# Patient Record
Sex: FEMALE | Race: WHITE | NOT HISPANIC OR LATINO | URBAN - METROPOLITAN AREA
[De-identification: names, ages, dates, MRNs, and addresses within clinical notes are randomized per-mention and may not be internally consistent; named-entity substitution may affect disease eponyms.]

---

## 2017-01-12 ENCOUNTER — OFFICE VISIT (OUTPATIENT)
Dept: SLEEP MEDICINE | Facility: CLINIC | Age: 57
End: 2017-01-12
Payer: COMMERCIAL

## 2017-01-12 VITALS
OXYGEN SATURATION: 96 % | HEIGHT: 63 IN | TEMPERATURE: 97.4 F | HEART RATE: 67 BPM | DIASTOLIC BLOOD PRESSURE: 74 MMHG | SYSTOLIC BLOOD PRESSURE: 133 MMHG | WEIGHT: 243.2 LBS | BODY MASS INDEX: 43.09 KG/M2

## 2017-01-12 DIAGNOSIS — G47.33 OSA (OBSTRUCTIVE SLEEP APNEA): ICD-10-CM

## 2017-01-12 DIAGNOSIS — G47.21 DELAYED SLEEP PHASE SYNDROME: ICD-10-CM

## 2017-01-12 DIAGNOSIS — F51.12 INSUFFICIENT SLEEP SYNDROME: ICD-10-CM

## 2017-01-12 DIAGNOSIS — R40.0 SLEEPINESS: Primary | ICD-10-CM

## 2017-01-12 PROCEDURE — 99214 OFFICE O/P EST MOD 30 MIN: CPT | Performed by: PHYSICIAN ASSISTANT

## 2017-01-12 NOTE — NURSING NOTE
"Chief Complaint   Patient presents with     Sleep Problem     follow up       Initial /74 mmHg  Pulse 67  Temp(Src) 97.4  F (36.3  C) (Oral)  Ht 1.6 m (5' 3\")  Wt 110.315 kg (243 lb 3.2 oz)  BMI 43.09 kg/m2  SpO2 96% Estimated body mass index is 43.09 kg/(m^2) as calculated from the following:    Height as of this encounter: 1.6 m (5' 3\").    Weight as of this encounter: 110.315 kg (243 lb 3.2 oz).  BP completed using cuff size: regular right arm  Sheeba Peguero MA  [facility]      "

## 2017-01-12 NOTE — PROGRESS NOTES
Sleep Study Follow-Up Visit:    Date on this visit: 1/12/2017    Huyen Lopez comes in today for follow-up of her treatment for mild ELLIE and sleepiness.    She initially was seen for sleepiness. She had a sleep study on May 5, 2013. At that time overall apnea/hypopnea index was 8.8 per hour with an overall respiratory disturbance index of 23.7 per hour.  She has an auto CPAP with pressures 7-20 cm.  Her insurance is no longer going to cover Adderall unless she is diagnosed with ADHD. She has been out for about a week and it has been a rough week.   She was last seen in 9/2016. She had concerns of residual sleepiness which was thought to be related to having to wake at 4:30 AM for work. She has a delayed sleep preference and has a hard time falling asleep before 11 PM. She was waking around 7 AM on weekends. We discussed treatment for DSPS at her prior appointment.  She has been trying to go to bed earlier. It has not been easy. She is dozing watching TV in the evening, which makes it harder to fall asleep when she gets into bed. She has been trying to get to bed between 10-10:30 PM. It can take 30 minutes to fall asleep. She wakes after a couple of hours. She wakes a couple times per night. Sometimes she is only briefly awake. Sometimes the awakenings can last up to an hour. She will turn the TV on if it is a longer awakening. Her mind wanders. She recently moved. She thinks about things she has to do and if she really likes the new location. She also has some family tension. She is now waking around 5 AM. She does not usually hit the snooze.   She did not use CPAP when she had a cold. She sometimes tears the mask off in the night. She feels claustrophobic. She does not like having something on her face.   The compliance data shows that she has used the CPAP for 24/30 nights, 63.3% of nights for >4 hours.  The 90th% pressure is 14.2 cm.  The average time in large leak is 0 sec.  The average nightly usage is 5:17.  " The average AHI is 3/hr. Her download shows that she is often not getting to bed until about 11:30-midnight. She might be falling asleep some nights before putting CPAP on and then putting it on when she wakes later. Several weekends she has not been using CPAP at all. Recently, she has been having some nights with an AHI of 5-6/hr, sometimes in clusters that would suggest residual apnea in REM. She denies alcohol or congestion recently.     She has been using a SAD lamp almost every morning for 30 minutes. She uses it in the living room while drying her hair. She is not using melatonin. On weekends, she is up by 6 AM to an alarm. She is other up a little earlier because her cat wakes her.  She is dozing at work, but not while driving.   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 She did adjust her work schedule to start 30 minutes later. She works 7:30 AM to 4 PM. She could adjust it later.     Past medical/surgical history, family history, social history, medications and allergies were reviewed.      Problem List:  Patient Active Problem List    Diagnosis Date Noted     Advanced directives, counseling/discussion 07/16/2013     Priority: Medium     Advance Care Planning:   ACP Review and Resources Provided:  Reviewed chart for advance care plan.  Huyen Lopez has no plan or code status on file. Discussed available resources and provided with information. Confirmed code status reflects current choices pending further ACP discussions. Declined information.  Carla Lala MA       Confirmed/documented designated decision maker(s). See permanent comments section of demographics in clinical tab.   Added by Carla Lala on 7/16/2013               ELLIE (obstructive sleep apnea)-mild (AHI 9) 05/16/2013     Priority: Medium     Indications for Polysomnogram 5/5/2013: The patient is a 53 y year old Female who is 5' 3\" and weighs 208.0 lbs.  Her BMI equals 36.9, [facility] sleepiness scale 18 and neck size is 14.  A " full night polysomnogram was performed to evaluate for excessive sleepiness, insomnia and possible ELLIE.     Polysomnogram Data:  A full night polysomnogram recorded the standard physiologic parameters including EEG, EOG, EMG, EKG, nasal and oral airflow.  Respiratory parameters of chest and abdominal movements were recorded with respiratory inductance plethysmography.  Oxygen saturation was recorded by pulse oximetry.      Sleep Architecture:  The total recording time of the polysomnogram was 515.4 minutes.  The total sleep time was 463.5 minutes.  Sleep latency was 11.3 minutes with Trazadone.  REM latency was 180.5 minutes.  Arousal Index was 39.6.  Sleep Efficiency was 89.9%.  Wake after sleep onset was 40.5.  The patient spent 9.4% of total sleep time in Stage N1, 38.5% in Stage N2, 44.8% in Stages N3, and 7.3% in REM.       Respiration:      Sustained Sleep Associated Hypoventilation - was not monitored.    Sleep Associated Hypoxemia - was present.  Baseline oxygen saturation was 95.1%.  Snoring was reported as loud.  Lowest oxygen saturation was 88.0%    Events - The polysomnogram revealed a presence of 13 obstructive, 0 central, and 0 mixed apneas resulting in an Apnea index of 1.7 events per hour.  There were 55 hypopneas resulting in a Hypopnea index of 7.1 events per hour.  The combined Apnea/Hypopnea Index was 8.8 events per hour.  The REM AHI is 45.9.  The supine AHI is 8.1.  The RERA index was 14.9 per hour.   The RDI was  23.7.   14.9 8.8     Movement Activity:      Limb Notes - There were 32 limb movements recorded.  Of this total, 24 were classified as PLMs.  Of the PLMs, 1 were associated with arousals.  The Limb Movement index was 4.1 per hour while the PLM index was 3.1per hour.    Behavior - none    Bruxism - none    Seizures - none    Cardiac Summary:   The average pulse rate was 52.0 bpm.  The minimum pulse rate was 39.9 bpm while the maximum pulse rate was 75.1 bpm.    Assessment:     Mild ELLIE  (AHI 9)    Recommendations:    Treatment options include a trial of auto-CPAP to assess response to treatment of ELLIE. Alternatively, a dental appliance could be considered if dentition allows or referral to ENT in carefully selected patients.     Advise regarding the risks of drowsy driving    Suggest optimizing sleep hygiene and avoiding sleep deprivation    Weight management  Dopaminergic therapy should be used for management of restless legs syndrome and not based on the presence of periodic limb movements alone       Hypertension goal BP (blood pressure) < 140/90 12/20/2012     Priority: Medium     UTI (urinary tract infection) 10/06/2012     Priority: Medium     Hypercalcemia 12/29/2011     Priority: Medium     Mixed incontinence urge and stress (male)(female)      Priority: Medium     S/P hysterectomy 04/04/2011     Priority: Medium     HYPERLIPIDEMIA LDL GOAL <130 10/31/2010     Priority: Medium     Obesity      Priority: Medium     Has lost weight with diet programs intermittently       Thyroid nodules      Priority: Medium     Noted in eval for high calcium  Problem list name updated by automated process. Provider to review and confirm       Hyperparathyroidism (H)      Priority: Medium     Followed by Dr. Roni Weston       Hypothyroidism      Priority: Medium        Impression/Plan:    (R40.0) Sleepiness  (primary encounter diagnosis), (F51.12) Insufficient sleep syndrome, (G47.21) Delayed sleep phase syndrome  Comment: Her CPAP states she is only getting an average 5:17 hours of CPAP use. She does sleep some without it as well. She has not had a single night with more than 7 hours of use in the last month. She is dozing in the evening before bedtime. Her sleep is disrupted by her cat in the morning. Her sleep at night is still fragmented, likely because of the dozing in the evening. Depression may still be playing a role in her low energy. She seemed on the verge of tears a couple of times  today.  Plan: She was advised to continue to try to go to bed around 10 PM and wake at 5 AM. Start taking 0.5-1 mg melatonin around 8 PM. If feeling tired, get up and be active to avoid dozing. If she can do this and is still having daytime sleepiness, we may consider a full sleepiness evaluation with actigraphy, PSG and MSLT. I would like to see her be able to get more sleep first and possibly re-evaluate for residual depression. We may try to delay her work start another 30 minutes to allow her to sleep more within her natural circadian preference.      (G47.33) ELLIE (obstructive sleep apnea)  Comment: Her usage is still low. She has a few nights with clusters of residual obstruction. She feels the pressure is a little low when she first starts out.  Plan: I changed her pressures to 10-20 cm. I set the ramp to start at 8 cm and last 15 minutes. I showed her how to adjust the ramp and humidity. We talked about considering the dental appliance if she continues to struggle using CPAP regularly.      She will follow up with me in about 2 month(s).     Twenty-five minutes spent with patient, all of which were spent face-to-face counseling, consulting, coordinating plan of care.      Bennett Goltz, PA-C    CC: No ref. provider found

## 2017-01-12 NOTE — MR AVS SNAPSHOT
After Visit Summary   1/12/2017    Huyen Lopez    MRN: [id number]           Patient Information     Date Of Birth          1960        Visit Information        Provider Department      1/12/2017 10:30 AM Goltz, Bennett Ezra, PA-C [facility]        Today's Diagnoses     Sleepiness    -  1     Insufficient sleep syndrome         Delayed sleep phase syndrome         ELLIE (obstructive sleep apnea)           Care Instructions    Instructions for treating Delayed Sleep Phase Syndrome:    Delayed Sleep Phase Syndrome (DSPS) means that your body's internal timing is set late compared to the 24 hour day. Therefore, it is often difficult to get up on time for work in the morning and sometimes difficult to fall asleep on time, in order to get enough sleep. People with DSPS often tend to like to stay up late on weekends and sleep in until between 10 AM and noon, sometimes even later.This is actually a bad habit that will perpetuate the problem. It reinforces your body's tendency to be on that later schedule.    You should go to bed when you are sleepy and ready to sleep. During this entire process, you should not engage in activities that may make it worse, such as watching TV in bed, leaving the TV on all night, drinking any caffeine 6 hours before bed or exercising 1-2 hours before bed.     Start taking Melatonin, 1 mg tablet 3-5 hours before the time that you fall asleep on average (not your desired bedtime or time that you get in bed, but the time you normally fall asleep on your own).     Upon awakening, get exposure to sun-light for about 30-45 minutes. You do not need to look at the sun, in fact, this is dangerous. Reading the paper with the sun shining on you is adequate.  Alternatively, you may use a Seasonal Affective Disorder Lamp (intensity 10,000 Lux) instead of the sun. The lamp should be positioned 1-2 arms lengths away from you. They lamps are sold at Home Medical  Companies such as [employer], [employer] or [employer]. A prescription can be written to get insurance coverage in some cases. They are also sold on [employer].    Using the light and melatonin should help march your internal clock (known as your circadian rhythms) gradually earlier. As your bedtime advances, remember to take your melatonin earlier, keeping it 5 hours before your fall asleep time.    Avoid naps and sleeping in because sleeping during the day will delay your body's clock and you will have to start from scratch.     More information about light therapy:  If the cost of any light box is too much, you can also purchase a compact fluorescent all spectrum light bulb at a local hardware store for about $8.  The most commonly available bulb is 1400 lumen.  You would need two of these positioned within 1 meter of yourself to be equivalent to 2,500 lux.  The bulbs can be placed in a standard light fixture.  Additionally, they can be placed in a mountable fixture that is used in greenhouses.  Mountable fixtures are also available at hardware stores for about $9.  Do not look directly at the light.  If you have any concerns regarding the safety of bright light therapy for you, it is recommended that you consult an ophthalmologist before using a light box.  If you have a condition that makes your eyes very sensitive to light, macular degeneration, a family history of such problems, or diabetic changes to your eyes, consult an ophthalmologist before using a light box. If you have anxiety disorder and have an increase in anxiety discontinue use.    Your BMI is Body mass index is 43.09 kg/(m^2).  Weight management is a personal decision.  If you are interested in exploring weight loss strategies, the following discussion covers the approaches that may be successful. Body mass index (BMI) is one way to tell whether you are at a healthy weight, overweight, or obese. It measures your weight in relation to your  height.  A BMI of 18.5 to 24.9 is in the healthy range. A person with a BMI of 25 to 29.9 is considered overweight, and someone with a BMI of 30 or greater is considered obese. More than two-thirds of American adults are considered overweight or obese.  Being overweight or obese increases the risk for further weight gain. Excess weight may lead to heart disease and diabetes.  Creating and following plans for healthy eating and physical activity may help you improve your health.  Weight control is part of healthy lifestyle and includes exercise, emotional health, and healthy eating habits. Careful eating habits lifelong are the mainstay of weight control. Though there are significant health benefits from weight loss, long-term weight loss with diet alone may be very difficult to achieve- studies show long-term success with dietary management in less than 10% of people. Attaining a healthy weight may be especially difficult to achieve in those with severe obesity. In some cases, medications, devices and surgical management might be considered.  What can you do?  If you are overweight or obese and are interested in methods for weight loss, you should discuss this with your provider.     Consider reducing daily calorie intake by 500 calories.     Keep a food journal.     Avoiding skipping meals, consider cutting portions instead.    Diet combined with exercise helps maintain muscle while optimizing fat loss. Strength training is particularly important for building and maintaining muscle mass. Exercise helps reduce stress, increase energy, and improves fitness. Increasing exercise without diet control, however, may not burn enough calories to loose weight.       Start walking three days a week 10-20 minutes at a time    Work towards walking thirty minutes five days a week     Eventually, increase the speed of your walking for 1-2 minutes at time    In addition, we recommend that you review healthy lifestyles and methods  "for weight loss available through the National Institutes of Health patient information sites:  http://win.niddk.nih.gov/publications/index.htm    And look into health and wellness programs that may be available through your health insurance provider, employer, local community center, or raine club.    Weight management plan: Patient was referred to their PCP to discuss a diet and exercise plan.            Follow-ups after your visit        Who to contact     If you have questions or need follow up information about today's clinic visit or your schedule please contact [facility] directly at [phone].  Normal or non-critical lab and imaging results will be communicated to you by MyChart, letter or phone within 4 business days after the clinic has received the results. If you do not hear from us within 7 days, please contact the clinic through [employer] or phone. If you have a critical or abnormal lab result, we will notify you by phone as soon as possible.  Submit refill requests through freee or call your pharmacy and they will forward the refill request to us. Please allow 3 business days for your refill to be completed.          Additional Information About Your Visit        MyChart Information     freee gives you secure access to your electronic health record. If you see a primary care provider, you can also send messages to your care team and make appointments. If you have questions, please call your primary care clinic.  If you do not have a primary care provider, please call [phone] and they will assist you.        Care EveryWhere ID     This is your Care EveryWhere ID. This could be used by other organizations to access your [place] medical records  [id number]        Your Vitals Were     Pulse Temperature Height BMI (Body Mass Index) Pulse Oximetry       67 97.4  F (36.3  C) (Oral) 1.6 m (5' 3\") 43.09 kg/m2 96%        Blood Pressure from Last 3 Encounters:   01/12/17 133/74 "   11/03/16 135/87   09/02/16 152/87    Weight from Last 3 Encounters:   01/12/17 110.315 kg (243 lb 3.2 oz)   11/03/16 111.041 kg (244 lb 12.8 oz)   09/02/16 113.399 kg (250 lb)              Today, you had the following     No orders found for display       Primary Care Provider Office Phone # Fax #    Jerome Otero [phone]       [facility] [address]    [place] [address]        Thank you!     Thank you for choosing [facility]  for your care. Our goal is always to provide you with excellent care. Hearing back from our patients is one way we can continue to improve our services. Please take a few minutes to complete the written survey that you may receive in the mail after your visit with us. Thank you!             Your Updated Medication List - Protect others around you: Learn how to safely use, store and throw away your medicines at www.disposemymeds.org.          This list is accurate as of: 1/12/17 11:11 AM.  Always use your most recent med list.                   Brand Name Dispense Instructions for use    amLODIPine 5 MG tablet    NORVASC    30 tablet    Take 1 tablet (5 mg) by mouth daily       amphetamine-dextroamphetamine 30 MG per 24 hr capsule    ADDERALL XR     Take 40 mg by mouth daily       aspirin 81 MG tablet     90 tablet    Take 1 tablet by mouth daily.       atenolol 50 MG tablet    TENORMIN    45 tablet    Take 0.5 tablets (25 mg) by mouth daily       clobetasol propionate 0.05 % Foam     50 g    Apply sparingly to affected area twice daily as needed.  Do not apply to face.       levothyroxine 137 MCG tablet    SYNTHROID/LEVOTHROID    30 tablet    Take 1 tablet (137 mcg) by mouth daily       lisinopril-hydrochlorothiazide 20-12.5 MG per tablet    PRINZIDE/ZESTORETIC    30 tablet    Take 1 tablet by mouth daily       order for [facility]      Respironics REMSTAR 60 Series Auto CPAP 5-20cm H2O, [place] Fx for her Small nasal mask        oxybutynin 5 MG 24 hr tablet    DITROPAN-XL    90 tablet    Take 1 tablet (5 mg) by mouth daily       simvastatin 20 MG tablet    ZOCOR    90 tablet    Take 1 tablet (20 mg) by mouth At Bedtime       venlafaxine 37.5 MG 24 hr capsule    EFFEXOR-XR     Take 150 mg by mouth daily 37.5mg once a day and 75 mg once a day       VITAMIN D-3 PO      Take 2,000 Units by mouth

## 2017-01-12 NOTE — PATIENT INSTRUCTIONS
Instructions for treating Delayed Sleep Phase Syndrome:    Delayed Sleep Phase Syndrome (DSPS) means that your body's internal timing is set late compared to the 24 hour day. Therefore, it is often difficult to get up on time for work in the morning and sometimes difficult to fall asleep on time, in order to get enough sleep. People with DSPS often tend to like to stay up late on weekends and sleep in until between 10 AM and noon, sometimes even later.This is actually a bad habit that will perpetuate the problem. It reinforces your body's tendency to be on that later schedule.    You should go to bed when you are sleepy and ready to sleep. During this entire process, you should not engage in activities that may make it worse, such as watching TV in bed, leaving the TV on all night, drinking any caffeine 6 hours before bed or exercising 1-2 hours before bed.     Start taking Melatonin, 1 mg tablet 3-5 hours before the time that you fall asleep on average (not your desired bedtime or time that you get in bed, but the time you normally fall asleep on your own).     Upon awakening, get exposure to sun-light for about 30-45 minutes. You do not need to look at the sun, in fact, this is dangerous. Reading the paper with the sun shining on you is adequate.  Alternatively, you may use a Seasonal Affective Disorder Lamp (intensity 10,000 Lux) instead of the sun. The lamp should be positioned 1-2 arms lengths away from you. They lamps are sold at Home [employer] Companies such as [employer] or [employer]. A prescription can be written to get insurance coverage in some cases. They are also sold on [employer].    Using the light and melatonin should help march your internal clock (known as your circadian rhythms) gradually earlier. As your bedtime advances, remember to take your melatonin earlier, keeping it 5 hours before your fall asleep time.    Avoid naps and sleeping in because sleeping during the day will delay  your body's clock and you will have to start from scratch.     More information about light therapy:  If the cost of any light box is too much, you can also purchase a compact fluorescent all spectrum light bulb at a local hardware store for about $8.  The most commonly available bulb is 1400 lumen.  You would need two of these positioned within 1 meter of yourself to be equivalent to 2,500 lux.  The bulbs can be placed in a standard light fixture.  Additionally, they can be placed in a mountable fixture that is used in greenhouses.  Mountable fixtures are also available at hardware stores for about $9.  Do not look directly at the light.  If you have any concerns regarding the safety of bright light therapy for you, it is recommended that you consult an ophthalmologist before using a light box.  If you have a condition that makes your eyes very sensitive to light, macular degeneration, a family history of such problems, or diabetic changes to your eyes, consult an ophthalmologist before using a light box. If you have anxiety disorder and have an increase in anxiety discontinue use.    Your BMI is Body mass index is 43.09 kg/(m^2).  Weight management is a personal decision.  If you are interested in exploring weight loss strategies, the following discussion covers the approaches that may be successful. Body mass index (BMI) is one way to tell whether you are at a healthy weight, overweight, or obese. It measures your weight in relation to your height.  A BMI of 18.5 to 24.9 is in the healthy range. A person with a BMI of 25 to 29.9 is considered overweight, and someone with a BMI of 30 or greater is considered obese. More than two-thirds of American adults are considered overweight or obese.  Being overweight or obese increases the risk for further weight gain. Excess weight may lead to heart disease and diabetes.  Creating and following plans for healthy eating and physical activity may help you improve your  health.  Weight control is part of healthy lifestyle and includes exercise, emotional health, and healthy eating habits. Careful eating habits lifelong are the mainstay of weight control. Though there are significant health benefits from weight loss, long-term weight loss with diet alone may be very difficult to achieve- studies show long-term success with dietary management in less than 10% of people. Attaining a healthy weight may be especially difficult to achieve in those with severe obesity. In some cases, medications, devices and surgical management might be considered.  What can you do?  If you are overweight or obese and are interested in methods for weight loss, you should discuss this with your provider.     Consider reducing daily calorie intake by 500 calories.     Keep a food journal.     Avoiding skipping meals, consider cutting portions instead.    Diet combined with exercise helps maintain muscle while optimizing fat loss. Strength training is particularly important for building and maintaining muscle mass. Exercise helps reduce stress, increase energy, and improves fitness. Increasing exercise without diet control, however, may not burn enough calories to loose weight.       Start walking three days a week 10-20 minutes at a time    Work towards walking thirty minutes five days a week     Eventually, increase the speed of your walking for 1-2 minutes at time    In addition, we recommend that you review healthy lifestyles and methods for weight loss available through the National Institutes of Health patient information sites:  http://win.niddk.nih.gov/publications/index.htm    And look into health and wellness programs that may be available through your health insurance provider, employer, local community center, or raine club.    Weight management plan: Patient was referred to their PCP to discuss a diet and exercise plan.

## 2017-02-14 ENCOUNTER — TELEPHONE (OUTPATIENT)
Dept: SLEEP MEDICINE | Facility: CLINIC | Age: 57
End: 2017-02-14

## 2017-03-20 ENCOUNTER — OFFICE VISIT (OUTPATIENT)
Dept: OPTOMETRY | Facility: CLINIC | Age: 57
End: 2017-03-20
Payer: COMMERCIAL

## 2017-03-20 DIAGNOSIS — H02.823 CYST OF EYELID, RIGHT: ICD-10-CM

## 2017-03-20 DIAGNOSIS — H52.4 PRESBYOPIA: Primary | ICD-10-CM

## 2017-03-20 DIAGNOSIS — H52.203 ASTIGMATISM OF BOTH EYES: ICD-10-CM

## 2017-03-20 PROCEDURE — 92014 COMPRE OPH EXAM EST PT 1/>: CPT | Performed by: OPTOMETRIST

## 2017-03-20 PROCEDURE — 92015 DETERMINE REFRACTIVE STATE: CPT | Performed by: OPTOMETRIST

## 2017-03-20 ASSESSMENT — KERATOMETRY
OD_K1POWER_DIOPTERS: 45.50
OS_K2POWER_DIOPTERS: 45.50
OD_AXISANGLE2_DEGREES: 152
OS_AXISANGLE2_DEGREES: 158
OS_K1POWER_DIOPTERS: 46.00
OD_K2POWER_DIOPTERS: 46.00

## 2017-03-20 ASSESSMENT — EXTERNAL EXAM - RIGHT EYE: OD_EXAM: NORMAL

## 2017-03-20 ASSESSMENT — REFRACTION_MANIFEST
OD_ADD: +2.25
OD_AXIS: 050
OS_SPHERE: +0.25
OS_CYLINDER: +0.50
OD_CYLINDER: +0.50
OS_SPHERE: +0.50
OS_ADD: +2.25
METHOD_AUTOREFRACTION: 1
OD_AXIS: 053
OD_SPHERE: +0.25
OS_AXIS: 175
OS_AXIS: 163
OD_CYLINDER: +0.50
OD_SPHERE: PLANO
OS_CYLINDER: +0.25

## 2017-03-20 ASSESSMENT — VISUAL ACUITY
OD_SC: 20/30
OS_CC: 20/30
OS_SC+: -2
METHOD: SNELLEN - LINEAR
OD_CC: 20/25-1
OD_SC+: -1
CORRECTION_TYPE: GLASSES
OS_SC: 20/30

## 2017-03-20 ASSESSMENT — CUP TO DISC RATIO
OD_RATIO: 0.2
OS_RATIO: 0.2

## 2017-03-20 ASSESSMENT — EXTERNAL EXAM - LEFT EYE: OS_EXAM: NORMAL

## 2017-03-20 ASSESSMENT — CONF VISUAL FIELD
OS_NORMAL: 1
OD_NORMAL: 1

## 2017-03-20 ASSESSMENT — REFRACTION_WEARINGRX
OS_AXIS: 015
OD_CYLINDER: +0.50
OS_ADD: +2.25
OD_SPHERE: +0.50
OD_ADD: +2.25
OS_SPHERE: +0.50
SPECS_TYPE: PAL
OS_CYLINDER: +0.50
OD_AXIS: 060

## 2017-03-20 ASSESSMENT — SLIT LAMP EXAM - LIDS: COMMENTS: NORMAL

## 2017-03-20 ASSESSMENT — TONOMETRY
IOP_METHOD: APPLANATION
OD_IOP_MMHG: 14
OS_IOP_MMHG: 14

## 2017-03-20 NOTE — PROGRESS NOTES
Chief Complaint   Patient presents with     COMPREHENSIVE EYE EXAM     yearly         Last Eye Exam: 6/10/15  Dilated Previously: Yes    What are you currently using to see?  Glasses, wears glasses for reading and computer        Distance Vision Acuity: Satisfied with vision, no changes. Doesn't wear glasses to drive or for distance tasks     Near Vision Acuity: Possibly a slight change, thinks that the glasses might need to be updated    Eye Comfort: good  Do you use eye drops? 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     Noticed bump on right lower lid x months, has not changed in size    OBJECTIVE: See Ophthalmology exam    ASSESSMENT:    ICD-10-CM    1. Presbyopia H52.4 EYE EXAM (SIMPLE-NONBILLABLE)     REFRACTIVE STATUS   2. Astigmatism of both eyes H52.203 EYE EXAM (SIMPLE-NONBILLABLE)     REFRACTIVE STATUS   3. Cyst of eyelid, right lower H02.823       PLAN:     Patient Instructions   Patient was advised of today's exam findings.  Fill glasses prescription  Return in 1 year for eye exam  See [facility] [place] if wants cyst removed from right lower lid      Laila Ortiz and Ramses  [facility] [place] [facility]   [address]  [place], [place] [address] [phone]              Barbie Orr O.D.  [facility]   [address] [place] [address] [phone]

## 2017-03-20 NOTE — MR AVS SNAPSHOT
After Visit Summary   3/20/2017    Huyen Lopez    MRN: [id number]           Patient Information     Date Of Birth          1960        Visit Information        Provider Department      3/20/2017 5:30 PM Barbie Orr, NAVEED [facility]        Today's Diagnoses     Presbyopia    -  1    Astigmatism of both eyes          Care Instructions    Patient was advised of today's exam findings.  Fill glasses prescription  Return in 1 year for eye exam  See [facility] [place] if wants cyst removed from right lower lid      Laila Ortiz and Ramses  [facility]   [address] [place] [address]   [phone]              Barbie Orr O.D.  [facility]   [address] [place], [place] [id number] [phone]          Follow-ups after your visit        Your next 10 appointments already scheduled     Mar 23, 2017 10:30 AM CDT   Return Sleep Patient with Bennett Ezra Goltz, PA-C   [facility] [place] [facility]    [address] [place] [id number] [phone]              Who to contact     If you have questions or need follow up information about today's clinic visit or your schedule please contact [facility] directly at [phone].  Normal or non-critical lab and imaging results will be communicated to you by MyChart, letter or phone within 4 business days after the clinic has received the results. If you do not hear from us within 7 days, please contact the clinic through MyChart or phone. If you have a critical or abnormal lab result, we will notify you by phone as soon as possible.  Submit refill requests through [employer] or call your pharmacy and they will forward the refill request to us. Please allow 3 business days for your refill to be completed.          Additional Information About Your Visit        [employer] Information     [employer] gives you secure  access to your electronic health record. If you see a primary care provider, you can also send messages to your care team and make appointments. If you have questions, please call your primary care clinic.  If you do not have a primary care provider, please call [phone] and they will assist you.        Care EveryWhere ID     This is your Care EveryWhere ID. This could be used by other organizations to access your [place] medical records  [id number]         Blood Pressure from Last 3 Encounters:   01/12/17 133/74   11/03/16 135/87   09/02/16 152/87    Weight from Last 3 Encounters:   01/12/17 110.3 kg (243 lb 3.2 oz)   11/03/16 111 kg (244 lb 12.8 oz)   09/02/16 113.4 kg (250 lb)              We Performed the Following     EYE EXAM (SIMPLE-NONBILLABLE)     REFRACTIVE STATUS        Primary Care Provider Office Phone # Fax #    Jerome Otero [phone]       [facility] [address]    [facility] [address]        Thank you!     Thank you for [facility]  [facility]. Our goal is always to provide you with excellent care. Hearing back from our patients is one way we can continue to improve our services. Please take a few minutes to complete the written survey that you may receive in the mail after your visit with us. Thank you!             Your Updated Medication List - Protect others around you: Learn how to safely use, store and throw away your medicines at www.disposemymeds.org.          This list is accurate as of: 3/20/17  6:08 PM.  Always use your most recent med list.                   Brand Name Dispense Instructions for use    amLODIPine 5 MG tablet    NORVASC    30 tablet    Take 1 tablet (5 mg) by mouth daily       amphetamine-dextroamphetamine 30 MG per 24 hr capsule    ADDERALL XR     Take 40 mg by mouth daily       aspirin 81 MG tablet     90 tablet    Take 1 tablet by mouth daily.       atenolol 50 MG tablet    TENORMIN    45 tablet     Take 0.5 tablets (25 mg) by mouth daily       clobetasol propionate 0.05 % Foam     50 g    Apply sparingly to affected area twice daily as needed.  Do not apply to face.       levothyroxine 137 MCG tablet    SYNTHROID/LEVOTHROID    30 tablet    Take 1 tablet (137 mcg) by mouth daily       lisinopril-hydrochlorothiazide 20-12.5 MG per tablet    PRINZIDE/ZESTORETIC    30 tablet    Take 1 tablet by mouth daily       order for [facility]      Respironics REMSTAR 60 Series Auto CPAP 5-20cm H2O, [place] Fx for her Small nasal mask       oxybutynin 5 MG 24 hr tablet    DITROPAN-XL    90 tablet    Take 1 tablet (5 mg) by mouth daily       simvastatin 20 MG tablet    ZOCOR    90 tablet    Take 1 tablet (20 mg) by mouth At Bedtime       venlafaxine 37.5 MG 24 hr capsule    EFFEXOR-XR     Take 150 mg by mouth daily 37.5mg once a day and 75 mg once a day       VITAMIN D-3 PO      Take 2,000 Units by mouth

## 2017-03-20 NOTE — PATIENT INSTRUCTIONS
Patient was advised of today's exam findings.  Fill glasses prescription  Return in 1 year for eye exam  See [facility] [place] if wants cyst removed from right lower lid      Laila Ortiz and Ramses  [facility]   [address] [place] [address]. [place] [address]   [phone]              Barbie Orr O.D.  [facility]   [address] [place], [place] [id number] [phone]

## 2017-03-23 ENCOUNTER — OFFICE VISIT (OUTPATIENT)
Dept: SLEEP MEDICINE | Facility: CLINIC | Age: 57
End: 2017-03-23
Payer: COMMERCIAL

## 2017-03-23 VITALS
DIASTOLIC BLOOD PRESSURE: 89 MMHG | BODY MASS INDEX: 46.07 KG/M2 | WEIGHT: 260 LBS | HEIGHT: 63 IN | TEMPERATURE: 98 F | HEART RATE: 83 BPM | OXYGEN SATURATION: 97 % | SYSTOLIC BLOOD PRESSURE: 150 MMHG

## 2017-03-23 DIAGNOSIS — G47.33 OSA (OBSTRUCTIVE SLEEP APNEA): Primary | ICD-10-CM

## 2017-03-23 DIAGNOSIS — G47.21 DELAYED SLEEP PHASE SYNDROME: ICD-10-CM

## 2017-03-23 PROCEDURE — 99214 OFFICE O/P EST MOD 30 MIN: CPT | Performed by: PHYSICIAN ASSISTANT

## 2017-03-23 ASSESSMENT — PAIN SCALES - GENERAL: PAINLEVEL: NO PAIN (0)

## 2017-03-23 NOTE — NURSING NOTE
"Chief Complaint   Patient presents with     Sleep Problem     cpap follow up       Initial /89  Pulse 83  Temp 98  F (36.7  C) (Oral)  Ht 1.6 m (5' 2.99\")  Wt 117.9 kg (260 lb)  SpO2 97%  BMI 46.07 kg/m2 Estimated body mass index is 46.07 kg/(m^2) as calculated from the following:    Height as of this encounter: 1.6 m (5' 2.99\").    Weight as of this encounter: 117.9 kg (260 lb).  Medication Reconciliation: complete   ESS 12/24  Marian Hernandez MA      "

## 2017-03-23 NOTE — PATIENT INSTRUCTIONS

## 2017-03-23 NOTE — MR AVS SNAPSHOT
After Visit Summary   3/23/2017    Huyen Lopez    MRN: [id number]           Patient Information     Date Of Birth          1960        Visit Information        Provider Department      3/23/2017 10:30 AM Goltz, Bennett Ezra, PA-C [facility]        Today's Diagnoses     ELLIE (obstructive sleep apnea)-mild (AHI 9)    -  1    Delayed sleep phase syndrome          Care Instructions      Your BMI is Body mass index is 46.07 kg/(m^2).  Weight management is a personal decision.  If you are interested in exploring weight loss strategies, the following discussion covers the approaches that may be successful. Body mass index (BMI) is one way to tell whether you are at a healthy weight, overweight, or obese. It measures your weight in relation to your height.  A BMI of 18.5 to 24.9 is in the healthy range. A person with a BMI of 25 to 29.9 is considered overweight, and someone with a BMI of 30 or greater is considered obese. More than two-thirds of American adults are considered overweight or obese.  Being overweight or obese increases the risk for further weight gain. Excess weight may lead to heart disease and diabetes.  Creating and following plans for healthy eating and physical activity may help you improve your health.  Weight control is part of healthy lifestyle and includes exercise, emotional health, and healthy eating habits. Careful eating habits lifelong are the mainstay of weight control. Though there are significant health benefits from weight loss, long-term weight loss with diet alone may be very difficult to achieve- studies show long-term success with dietary management in less than 10% of people. Attaining a healthy weight may be especially difficult to achieve in those with severe obesity. In some cases, medications, devices and surgical management might be considered.  What can you do?  If you are overweight or obese and are interested in methods for weight loss,  you should discuss this with your provider.     Consider reducing daily calorie intake by 500 calories.     Keep a food journal.     Avoiding skipping meals, consider cutting portions instead.    Diet combined with exercise helps maintain muscle while optimizing fat loss. Strength training is particularly important for building and maintaining muscle mass. Exercise helps reduce stress, increase energy, and improves fitness. Increasing exercise without diet control, however, may not burn enough calories to loose weight.       Start walking three days a week 10-20 minutes at a time    Work towards walking thirty minutes five days a week     Eventually, increase the speed of your walking for 1-2 minutes at time    In addition, we recommend that you review healthy lifestyles and methods for weight loss available through the National Institutes of Health patient information sites:  http://win.niddk.nih.gov/publications/index.htm    And look into health and wellness programs that may be available through your health insurance provider, employer, local community center, or raine club.    Weight management plan: Patient was referred to their PCP to discuss a diet and exercise plan.            Follow-ups after your visit        Follow-up notes from your care team     Return in about 1 year (around 3/23/2018), or if symptoms worsen or fail to improve, for Circadian rhythm management, CPAP compliance recheck.      Who to contact     If you have questions or need follow up information about today's clinic visit or your schedule please contact [facility] directly at [phone].  Normal or non-critical lab and imaging results will be communicated to you by MyChart, letter or phone within 4 business days after the clinic has received the results. If you do not hear from us within 7 days, please contact the clinic through MyChart or phone. If you have a critical or abnormal lab result, we will notify you by  "phone as soon as possible.  Submit refill requests through [employer] or call your pharmacy and they will forward the refill request to us. Please allow 3 business days for your refill to be completed.          Additional Information About Your Visit        [employer] Information     [employer] gives you secure access to your electronic health record. If you see a primary care provider, you can also send messages to your care team and make appointments. If you have questions, please call your primary care clinic.  If you do not have a primary care provider, please call [phone] and they will assist you.        Care EveryWhere ID     This is your Care EveryWhere ID. This could be used by other organizations to access your [place] medical records  [id number]        Your Vitals Were     Pulse Temperature Height Pulse Oximetry BMI (Body Mass Index)       83 98  F (36.7  C) (Oral) 1.6 m (5' 2.99\") 97% 46.07 kg/m2        Blood Pressure from Last 3 Encounters:   03/23/17 150/89   01/12/17 133/74   11/03/16 135/87    Weight from Last 3 Encounters:   03/23/17 117.9 kg (260 lb)   01/12/17 110.3 kg (243 lb 3.2 oz)   11/03/16 111 kg (244 lb 12.8 oz)              Today, you had the following     No orders found for display       Primary Care Provider Office Phone # Fax #    Jerome Otero [phone]       [facility] [address]    [place] [address]        Thank you!     Thank you for choosing [facility]  for your care. Our goal is always to provide you with excellent care. Hearing back from our patients is one way we can continue to improve our services. Please take a few minutes to complete the written survey that you may receive in the mail after your visit with us. Thank you!             Your Updated Medication List - Protect others around you: Learn how to safely use, store and throw away your medicines at www.disposemymeds.org.          This list is accurate as of: " 3/23/17 11:23 AM.  Always use your most recent med list.                   Brand Name Dispense Instructions for use    amLODIPine 5 MG tablet    NORVASC    30 tablet    Take 1 tablet (5 mg) by mouth daily       amphetamine-dextroamphetamine 30 MG per 24 hr capsule    ADDERALL XR     Take 40 mg by mouth daily       aspirin 81 MG tablet     90 tablet    Take 1 tablet by mouth daily.       atenolol 50 MG tablet    TENORMIN    45 tablet    Take 0.5 tablets (25 mg) by mouth daily       clobetasol propionate 0.05 % Foam     50 g    Apply sparingly to affected area twice daily as needed.  Do not apply to face.       levothyroxine 137 MCG tablet    SYNTHROID/LEVOTHROID    30 tablet    Take 1 tablet (137 mcg) by mouth daily       lisinopril-hydrochlorothiazide 20-12.5 MG per tablet    PRINZIDE/ZESTORETIC    30 tablet    Take 1 tablet by mouth daily       order for [facility]      Respironics REMSTAR 60 Series Auto CPAP 5-20cm H2O, [place] Fx for her Small nasal mask       oxybutynin 5 MG 24 hr tablet    DITROPAN-XL    90 tablet    Take 1 tablet (5 mg) by mouth daily       simvastatin 20 MG tablet    ZOCOR    90 tablet    Take 1 tablet (20 mg) by mouth At Bedtime       venlafaxine 37.5 MG 24 hr capsule    EFFEXOR-XR     Take 150 mg by mouth daily 37.5mg once a day and 75 mg once a day       VITAMIN D-3 PO      Take 2,000 Units by mouth

## 2017-03-23 NOTE — PROGRESS NOTES
Sleep Study Follow-Up Visit:    Date on this visit: 3/23/2017    Huyen Lopez comes in today for follow-up of her treatment of ELLIE and sleepiness.   She initially was seen for sleepiness. She had a sleep study on May 5, 2013. At that time overall apnea/hypopnea index was 8.8 per hour with an overall respiratory disturbance index of 23.7 per hour.  She has an auto CPAP with pressures 10-20 cm. At the last visit, we were working on advancing her circadian rhythms and/or delaying her work start time to help her get more sleep. She feels things have been going a little better. She took a few nights off of CPAP because of a cold. She got back on Adderall (two capsules unsure of dose) this week and that has helped. She is still tired at night around 8 PM, but not during the day. She is not having too much problem getting to sleep. She sometimes has trouble if she tries to go to bed too early. She starts her wind down routine around 9 PM. She gets to sleep around 9:30-10 PM. She has tried melatonin, inconsistently. She continues to use the light in the morning. She gets up at 5 AM and starts work at 7:30 AM. On weekends, she goes to bed a little later but tries to get up by 6 AM or earlier. She sometimes can fall asleep on the couch 30 minutes after getting up. She tries not to let that happen. She is more tired on weekends. Prior to restarting Adderall, she was more consistently getting 7.5-8 hours of sleep. She was still dozing at work. Since starting Adderall, she has been averaging closer to 6 hours of sleep. She wakes 3-4 times per night. She falls back to sleep quickly most of the time. She has a SAD lamp at her desk, but is inconsistent with using it. Her sleepiness is worse in the morning.   She is tolerating CPAP better. She is not ripping it off in her sleep anymore. She is comfortable with the pressures now.   The compliance data shows that she has used the CPAP for 27/30 nights, 83.3% of nights for >4 hours.  " The 90th% pressure is 15.5 cm.  The average time in large leak is 0 sec.  The average nightly usage is 6:46.  The average AHI is 2.4/hr.    Past medical/surgical history, family history, social history, medications and allergies were reviewed.      Problem List:  Patient Active Problem List    Diagnosis Date Noted     Advanced directives, counseling/discussion 07/16/2013     Priority: Medium     Advance Care Planning:   ACP Review and Resources Provided:  Reviewed chart for advance care plan.  Huyen Lopez has no plan or code status on file. Discussed available resources and provided with information. Confirmed code status reflects current choices pending further ACP discussions. Declined information.  Carla Lala MA       Confirmed/documented designated decision maker(s). See permanent comments section of demographics in clinical tab.   Added by Carla Lala on 7/16/2013               ELLIE (obstructive sleep apnea)-mild (AHI 9) 05/16/2013     Priority: Medium     Indications for Polysomnogram 5/5/2013: The patient is a 53 y year old Female who is 5' 3\" and weighs 208.0 lbs.  Her BMI equals 36.9, [place] sleepiness scale 18 and neck size is 14.  A full night polysomnogram was performed to evaluate for excessive sleepiness, insomnia and possible ELLIE.     Polysomnogram Data:  A full night polysomnogram recorded the standard physiologic parameters including EEG, EOG, EMG, EKG, nasal and oral airflow.  Respiratory parameters of chest and abdominal movements were recorded with respiratory inductance plethysmography.  Oxygen saturation was recorded by pulse oximetry.      Sleep Architecture:  The total recording time of the polysomnogram was 515.4 minutes.  The total sleep time was 463.5 minutes.  Sleep latency was 11.3 minutes with Trazadone.  REM latency was 180.5 minutes.  Arousal Index was 39.6.  Sleep Efficiency was 89.9%.  Wake after sleep onset was 40.5.  The patient spent 9.4% of total sleep time in Stage " N1, 38.5% in Stage N2, 44.8% in Stages N3, and 7.3% in REM.       Respiration:      Sustained Sleep Associated Hypoventilation - was not monitored.    Sleep Associated Hypoxemia - was present.  Baseline oxygen saturation was 95.1%.  Snoring was reported as loud.  Lowest oxygen saturation was 88.0%    Events - The polysomnogram revealed a presence of 13 obstructive, 0 central, and 0 mixed apneas resulting in an Apnea index of 1.7 events per hour.  There were 55 hypopneas resulting in a Hypopnea index of 7.1 events per hour.  The combined Apnea/Hypopnea Index was 8.8 events per hour.  The REM AHI is 45.9.  The supine AHI is 8.1.  The RERA index was 14.9 per hour.   The RDI was  23.7.   14.9 8.8     Movement Activity:      Limb Notes - There were 32 limb movements recorded.  Of this total, 24 were classified as PLMs.  Of the PLMs, 1 were associated with arousals.  The Limb Movement index was 4.1 per hour while the PLM index was 3.1per hour.    Behavior - none    Bruxism - none    Seizures - none    Cardiac Summary:   The average pulse rate was 52.0 bpm.  The minimum pulse rate was 39.9 bpm while the maximum pulse rate was 75.1 bpm.    Assessment:     Mild ELLIE (AHI 9)    Recommendations:    Treatment options include a trial of auto-CPAP to assess response to treatment of ELLIE. Alternatively, a dental appliance could be considered if dentition allows or referral to ENT in carefully selected patients.     Advise regarding the risks of drowsy driving    Suggest optimizing sleep hygiene and avoiding sleep deprivation    Weight management  Dopaminergic therapy should be used for management of restless legs syndrome and not based on the presence of periodic limb movements alone       Hypertension goal BP (blood pressure) < 140/90 12/20/2012     Priority: Medium     UTI (urinary tract infection) 10/06/2012     Priority: Medium     Hypercalcemia 12/29/2011     Priority: Medium     Mixed incontinence urge and stress  (male)(female)      Priority: Medium     S/P hysterectomy 04/04/2011     Priority: Medium     HYPERLIPIDEMIA LDL GOAL <130 10/31/2010     Priority: Medium     Obesity      Priority: Medium     Has lost weight with diet programs intermittently       Thyroid nodules      Priority: Medium     Noted in eval for high calcium  Problem list name updated by automated process. Provider to review and confirm       Hyperparathyroidism (H)      Priority: Medium     Followed by Dr. Roni Weston       Hypothyroidism      Priority: Medium        Impression/Plan:    (G47.33) ELLIE (obstructive sleep apnea)-mild (AHI 9)  (primary encounter diagnosis)  Comment: She is doing quite well on CPAP.  Plan: Continue auto CPAP 10-20 cm.     (G47.21) Delayed sleep phase syndrome  Comment: She states she was still dozing at work when getting 7-8 hours of sleep most nights. She gets less on weekends because she stays up later and tries to get up at the same time. Since restarting Adderall, she has been going to bed a little later and is getting 30-60 minutes less sleep per night.   Plan: We reviewed how she is currently sleep depriving herself on weekends by staying up later and trying to get up at the same time. Adderall seems to be promoting insufficient sleep. She was advised to talk with her primary to reduce the dose and consider the regular release form. She will continue to work on getting more sleep on a consistent basis. She was encouraged to talk with her work again and consider delaying her work hours to let her sleep more within her natural circadian sleep preference. Continue getting 30 minutes of bright light exposure in the morning and using 0.5-1 mg melatonin 3-5 hours prior to natural sleep onset. She was encouraged to try to get as much of her morning pre-work routine done the night before so she can sleep in as late as possible.       She will follow up with me in about 1 year(s), sooner if needed.     Twenty-five  minutes spent with patient, all of which were spent face-to-face counseling, consulting, coordinating plan of care.      Bennett Goltz, PA-C    CC: No ref. provider found

## 2017-06-09 ENCOUNTER — TELEPHONE (OUTPATIENT)
Dept: FAMILY MEDICINE | Facility: CLINIC | Age: 57
End: 2017-06-09

## 2017-06-09 NOTE — TELEPHONE ENCOUNTER
Call Regarding Preventive Health Screening Colonoscopy and Mammogram    Attempt 1    Message on voicemail     Comments:       Outreach   Jocelyn Marino

## 2017-06-20 NOTE — TELEPHONE ENCOUNTER
6/20/2017    Call Regarding Preventive Health Screening Colonoscopy and Mammogram    Attempt 2    Message on voicemail     Comments:       Outreach   stefania

## 2017-07-26 NOTE — TELEPHONE ENCOUNTER
7/26/2017    Call Regarding Preventive Health Screening Colonoscopy and Mammogram    Attempt 3    Message on voicemail     Comments:       Outreach   CC

## 2018-02-26 DIAGNOSIS — G47.33 OSA (OBSTRUCTIVE SLEEP APNEA): Primary | ICD-10-CM

## 2019-05-15 ENCOUNTER — OFFICE VISIT (OUTPATIENT)
Dept: OPHTHALMOLOGY | Facility: CLINIC | Age: 59
End: 2019-05-15
Payer: COMMERCIAL

## 2019-05-15 DIAGNOSIS — Z01.00 EXAMINATION OF EYES AND VISION: Primary | ICD-10-CM

## 2019-05-15 DIAGNOSIS — H02.823 CYST OF RIGHT EYELID: ICD-10-CM

## 2019-05-15 DIAGNOSIS — H52.4 PRESBYOPIA: ICD-10-CM

## 2019-05-15 PROCEDURE — 92015 DETERMINE REFRACTIVE STATE: CPT | Performed by: STUDENT IN AN ORGANIZED HEALTH CARE EDUCATION/TRAINING PROGRAM

## 2019-05-15 PROCEDURE — 92004 COMPRE OPH EXAM NEW PT 1/>: CPT | Performed by: STUDENT IN AN ORGANIZED HEALTH CARE EDUCATION/TRAINING PROGRAM

## 2019-05-15 ASSESSMENT — VISUAL ACUITY
OD_CC: 20/20-1
OD_CC: J2+
OS_CC: J1
CORRECTION_TYPE: GLASSES
OS_CC: 20/20-1
METHOD: SNELLEN - LINEAR

## 2019-05-15 ASSESSMENT — SLIT LAMP EXAM - LIDS: COMMENTS: NORMAL

## 2019-05-15 ASSESSMENT — REFRACTION_WEARINGRX
OD_CYLINDER: +0.50
OS_CYLINDER: +0.50
OD_SPHERE: PLANO
OS_SPHERE: +0.50
OD_AXIS: 050
OS_ADD: +2.25
SPECS_TYPE: PAL
OD_ADD: +2.25
OS_AXIS: 015

## 2019-05-15 ASSESSMENT — TONOMETRY
OD_IOP_MMHG: 19
OS_IOP_MMHG: 19
IOP_METHOD: APPLANATION

## 2019-05-15 ASSESSMENT — REFRACTION_MANIFEST
OS_ADD: +3.00
OD_SPHERE: PLANO
OS_SPHERE: PLANO
OS_CYLINDER: +0.50
OD_CYLINDER: +0.75
OD_ADD: +3.00
OD_AXIS: 048
OS_AXIS: 155

## 2019-05-15 ASSESSMENT — EXTERNAL EXAM - RIGHT EYE: OD_EXAM: NORMAL

## 2019-05-15 ASSESSMENT — EXTERNAL EXAM - LEFT EYE: OS_EXAM: NORMAL

## 2019-05-15 ASSESSMENT — CUP TO DISC RATIO
OS_RATIO: 0.2
OD_RATIO: 0.2

## 2019-05-15 ASSESSMENT — CONF VISUAL FIELD
OD_NORMAL: 1
OS_NORMAL: 1

## 2019-05-15 NOTE — PROGRESS NOTES
Current Eye Medications:  no     Subjective:  Comprehensive eye exam.   Pt reports the print on her computer is getting harder to see.     Objective:  See Ophthalmology Exam.       Assessment:  Huyen Lopez is a 59 year old female who presents with:   Encounter Diagnoses   Name Primary?     Cyst of right eyelid Not bothersome to patient.        Plan:  Try +1.75 over the counter readers for computer distance    Glasses prescription given as well    Wally Pearce MD  [phone]

## 2019-05-15 NOTE — LETTER
5/15/2019         RE: Huyen Lopez  [address]  [address]  [facility] [address]      Dear Dr. Otero,    Thank you for referring your patient, Huyen Lopez, to the [facility].     Her eye exam is stable.  lease see a copy of my visit note below.     Current Eye Medications:  no     Subjective:  Comprehensive eye exam.   Pt reports the print on her computer is getting harder to see.     Objective:  See Ophthalmology Exam.       Assessment:  Huyen Lopez is a 59 year old female who presents with:   Encounter Diagnoses   Name Primary?     Cyst of right eyelid Not bothersome to patient.        Plan:  Try +1.75 over the counter readers for computer distance    Glasses prescription given as well    Wally Pearce MD  [phone]        Again, thank you for allowing me to participate in the care of your patient.        Sincerely,        Wally Pearce MD

## 2019-05-15 NOTE — PATIENT INSTRUCTIONS
Try +1.75 over the counter readers for computer distance    Glasses prescription given as well    Wally Pearce MD  [phone]

## 2019-07-31 DIAGNOSIS — G47.33 OSA (OBSTRUCTIVE SLEEP APNEA): Primary | ICD-10-CM

## 2019-11-08 ENCOUNTER — HEALTH MAINTENANCE LETTER (OUTPATIENT)
Age: 59
End: 2019-11-08

## 2020-12-06 ENCOUNTER — HEALTH MAINTENANCE LETTER (OUTPATIENT)
Age: 60
End: 2020-12-06

## 2021-06-24 ENCOUNTER — TELEPHONE (OUTPATIENT)
Dept: FAMILY MEDICINE | Facility: CLINIC | Age: 61
End: 2021-06-24

## 2021-09-25 ENCOUNTER — HEALTH MAINTENANCE LETTER (OUTPATIENT)
Age: 61
End: 2021-09-25

## 2021-11-20 ENCOUNTER — HEALTH MAINTENANCE LETTER (OUTPATIENT)
Age: 61
End: 2021-11-20

## 2022-01-13 RX ORDER — DEXTROAMPHETAMINE SACCHARATE, AMPHETAMINE ASPARTATE MONOHYDRATE, DEXTROAMPHETAMINE SULFATE AND AMPHETAMINE SULFATE 7.5; 7.5; 7.5; 7.5 MG/1; MG/1; MG/1; MG/1
40 CAPSULE, EXTENDED RELEASE ORAL DAILY
OUTPATIENT
Start: 2022-01-13

## 2022-01-13 NOTE — TELEPHONE ENCOUNTER
"amphetamine-dextroamphetamine (ADDERALL XR) 30 MG per capsule     --   Sig - Route: Take 40 mg by mouth daily  - Oral   Class: Historical   Order: [id number]     Adderall has never been prescribed in [facility]   Provider listed as PCP is not in the system to route to   Pt never has been seen at [facility]     Denied \"never under prescriber care\"     Porsche BRAUN, Triage RN  [facility]         "

## 2022-01-15 ENCOUNTER — HEALTH MAINTENANCE LETTER (OUTPATIENT)
Age: 62
End: 2022-01-15

## 2023-04-22 ENCOUNTER — HEALTH MAINTENANCE LETTER (OUTPATIENT)
Age: 63
End: 2023-04-22

## 2023-12-02 ENCOUNTER — HEALTH MAINTENANCE LETTER (OUTPATIENT)
Age: 63
End: 2023-12-02

## 2024-06-24 ENCOUNTER — TRANSFERRED RECORDS (OUTPATIENT)
Dept: HEALTH INFORMATION MANAGEMENT | Facility: CLINIC | Age: 64
End: 2024-06-24
Payer: COMMERCIAL

## 2024-06-24 ENCOUNTER — MEDICAL CORRESPONDENCE (OUTPATIENT)
Dept: HEALTH INFORMATION MANAGEMENT | Facility: CLINIC | Age: 64
End: 2024-06-24
Payer: COMMERCIAL

## 2024-06-29 ENCOUNTER — HEALTH MAINTENANCE LETTER (OUTPATIENT)
Age: 64
End: 2024-06-29

## 2024-07-02 DIAGNOSIS — R07.9 CHEST PAIN: ICD-10-CM

## 2024-07-02 DIAGNOSIS — R55 SYNCOPE: Primary | ICD-10-CM

## 2024-07-09 ENCOUNTER — TELEPHONE (OUTPATIENT)
Dept: CARDIOLOGY | Facility: CLINIC | Age: 64
End: 2024-07-09
Payer: COMMERCIAL

## 2024-07-09 NOTE — TELEPHONE ENCOUNTER
1st attempt- Sent [employer] for the patient to call back and schedule the following:    Appointment type:  Testing only- No clinic visit  Provider:  NANI Otero  Return date:  Now   Additional appointment(s) needed:  Zio patch mail out and Lexiscan    Specialty phone number: [phone]

## 2024-10-25 ENCOUNTER — TELEPHONE (OUTPATIENT)
Dept: CARDIOLOGY | Facility: CLINIC | Age: 64
End: 2024-10-25
Payer: COMMERCIAL

## 2024-10-25 NOTE — TELEPHONE ENCOUNTER
Please be advised that we have attempted to reach this patient to schedule stress testing however two attempts have been made to contact patient and they have not returned our call.  No further attempts to reach this patient will by made by scheduling team.  Please contact this patient to encourage them to call our office at [phone] and schedule this order if it is still clinically recommended.      Thank you for allowing [facility] to participate in this patients healthcare needs.

## 2024-12-16 ENCOUNTER — ANCILLARY PROCEDURE (OUTPATIENT)
Dept: MAMMOGRAPHY | Facility: CLINIC | Age: 64
End: 2024-12-16
Attending: FAMILY MEDICINE
Payer: COMMERCIAL

## 2024-12-16 DIAGNOSIS — Z12.31 VISIT FOR SCREENING MAMMOGRAM: ICD-10-CM

## 2024-12-16 PROCEDURE — 77067 SCR MAMMO BI INCL CAD: CPT | Mod: TC | Performed by: RADIOLOGY

## 2024-12-16 PROCEDURE — 77063 BREAST TOMOSYNTHESIS BI: CPT | Mod: TC | Performed by: RADIOLOGY

## 2025-05-20 ENCOUNTER — MEDICAL CORRESPONDENCE (OUTPATIENT)
Dept: HEALTH INFORMATION MANAGEMENT | Facility: CLINIC | Age: 65
End: 2025-05-20
Payer: COMMERCIAL

## 2025-05-20 DIAGNOSIS — I10 HTN (HYPERTENSION): ICD-10-CM

## 2025-05-20 DIAGNOSIS — E11.9 DIABETES MELLITUS, TYPE 2 (H): ICD-10-CM

## 2025-05-20 DIAGNOSIS — R42 EPISODIC LIGHTHEADEDNESS: ICD-10-CM

## 2025-05-20 DIAGNOSIS — G47.33 OSA (OBSTRUCTIVE SLEEP APNEA): ICD-10-CM

## 2025-05-20 DIAGNOSIS — E66.9 OBESITY: Primary | ICD-10-CM

## 2025-05-30 ENCOUNTER — ANCILLARY PROCEDURE (OUTPATIENT)
Dept: ULTRASOUND IMAGING | Facility: CLINIC | Age: 65
End: 2025-05-30
Attending: FAMILY MEDICINE
Payer: COMMERCIAL

## 2025-05-30 DIAGNOSIS — E66.9 OBESITY: ICD-10-CM

## 2025-05-30 DIAGNOSIS — E11.9 DIABETES MELLITUS, TYPE 2 (H): ICD-10-CM

## 2025-05-30 DIAGNOSIS — G47.33 OSA (OBSTRUCTIVE SLEEP APNEA): ICD-10-CM

## 2025-05-30 DIAGNOSIS — R42 EPISODIC LIGHTHEADEDNESS: ICD-10-CM

## 2025-05-30 DIAGNOSIS — I10 HTN (HYPERTENSION): ICD-10-CM

## 2025-05-30 PROCEDURE — 93880 EXTRACRANIAL BILAT STUDY: CPT | Mod: TC | Performed by: RADIOLOGY

## 2025-06-01 ENCOUNTER — HEALTH MAINTENANCE LETTER (OUTPATIENT)
Age: 65
End: 2025-06-01

## 2025-07-13 ENCOUNTER — HEALTH MAINTENANCE LETTER (OUTPATIENT)
Age: 65
End: 2025-07-13

## 2025-07-23 ENCOUNTER — ANCILLARY PROCEDURE (OUTPATIENT)
Dept: CARDIOLOGY | Facility: CLINIC | Age: 65
End: 2025-07-23
Attending: FAMILY MEDICINE
Payer: COMMERCIAL

## 2025-07-23 DIAGNOSIS — E66.9 OBESITY: ICD-10-CM

## 2025-07-23 DIAGNOSIS — R42 EPISODIC LIGHTHEADEDNESS: ICD-10-CM

## 2025-07-23 DIAGNOSIS — I10 HTN (HYPERTENSION): ICD-10-CM

## 2025-07-23 DIAGNOSIS — G47.33 OSA (OBSTRUCTIVE SLEEP APNEA): ICD-10-CM

## 2025-07-23 DIAGNOSIS — E11.9 DIABETES MELLITUS, TYPE 2 (H): ICD-10-CM

## 2025-07-23 LAB — LVEF ECHO: NORMAL

## 2025-07-23 PROCEDURE — 93306 TTE W/DOPPLER COMPLETE: CPT | Performed by: INTERNAL MEDICINE
